# Patient Record
Sex: MALE | Race: WHITE | Employment: OTHER | URBAN - METROPOLITAN AREA
[De-identification: names, ages, dates, MRNs, and addresses within clinical notes are randomized per-mention and may not be internally consistent; named-entity substitution may affect disease eponyms.]

---

## 2024-07-28 ENCOUNTER — HOSPITAL ENCOUNTER (EMERGENCY)
Facility: HOSPITAL | Age: 79
Discharge: HOME | End: 2024-07-28
Attending: EMERGENCY MEDICINE
Payer: MEDICARE

## 2024-07-28 VITALS
OXYGEN SATURATION: 99 % | DIASTOLIC BLOOD PRESSURE: 98 MMHG | TEMPERATURE: 98 F | HEART RATE: 88 BPM | WEIGHT: 227 LBS | RESPIRATION RATE: 16 BRPM | SYSTOLIC BLOOD PRESSURE: 176 MMHG

## 2024-07-28 DIAGNOSIS — R04.0 EPISTAXIS: Primary | ICD-10-CM

## 2024-07-28 PROCEDURE — 99283 EMERGENCY DEPT VISIT LOW MDM: CPT

## 2024-07-28 PROCEDURE — 99282 EMERGENCY DEPT VISIT SF MDM: CPT

## 2024-07-28 RX ORDER — OXYMETAZOLINE HYDROCHLORIDE 0.05 G/100ML
1 SPRAY NASAL EVERY 12 HOURS PRN
Status: DISCONTINUED | OUTPATIENT
Start: 2024-07-28 | End: 2024-07-28

## 2024-07-28 NOTE — ED INITIAL ASSESSMENT (HPI)
Patient presents to the ED c/o nose bleed x 20 minutes. Denies blood thinner use. Denies any injury or trauma.

## 2024-07-28 NOTE — ED PROVIDER NOTES
Patient Seen in: [facility] Emergency Department    History     Chief Complaint   Patient presents with    Nose Bleed     Stated Complaint: nose bleed    HPI    Patient complains of a nosebleed that began today.  Bleeding coming from l side.  No trauma.     Medication risks: no blood thinners  Associated symptoms: none    History reviewed. No pertinent past medical history.    Past Surgical History:   Procedure Laterality Date    Cataract      Colonoscopy      Total knee replacement Left             No family history on file.    Social History     Socioeconomic History    Marital status:    Tobacco Use    Smoking status: Never    Smokeless tobacco: Never   Substance and Sexual Activity    Alcohol use: Yes     Comment: occ    Drug use: Never       Review of Systems    Positive for stated complaint: nose bleed  Other systems are as noted in HPI.  Constitutional and vital signs reviewed.      All other systems reviewed and negative except as noted above.    PSFH elements reviewed from today and agreed except as otherwise stated in HPI.    Physical Exam     ED Triage Vitals [07/28/24 1207]   BP (!) 176/98   Pulse 88   Resp 16   Temp 97.9 °F (36.6 °C)   Temp src Temporal   SpO2 99 %   O2 Device None (Room air)       Current:BP (!) 176/98   Pulse 88   Temp 97.9 °F (36.6 °C) (Temporal)   Resp 16   Wt 103 kg   SpO2 99%   PULSE OX nl  GENERAL: awake alert  HEAD: normocephalic, atraumatic  EYES: PERRLA, EOMI,  NOSE: dried blood l ant nare no active bleeding, ,no clots in post pharynx  THROAT: mmm, no lesions  NECK: supple,  LUNGS: no resp distress,   CARDIO: RR  EXTREMITIES: moves all 4 spont, no edema  NEURO: alert, oriented x 3, 2-12 intact, no focal deficits appreciated  SKIN: good skin turgor, no rashes  PSYCH: calm, cooperative,          Physical Exam          ED Course   Labs Reviewed - No data to display    MDM     Monitor Interpretation:        Procedures:  The patient was treated with pressure,  vasoconstrictor medication then vaseline gauze.  Following the procedure the patient was re-examined and the bleeding was well controlled.  The patient tolerated the procedure well. The procedure was performed by myself.    Medical Decision Making  Problems Addressed:  Epistaxis: acute illness or injury    Risk  OTC drugs.            Disposition and Plan     Clinical Impression:  1. Epistaxis        Disposition:  Discharge    Follow-up:  Cassie Acosta  [address] [address] [phone]    Follow up        Medications Prescribed:  There are no discharge medications for this patient.